# Patient Record
Sex: MALE | Race: WHITE | NOT HISPANIC OR LATINO
[De-identification: names, ages, dates, MRNs, and addresses within clinical notes are randomized per-mention and may not be internally consistent; named-entity substitution may affect disease eponyms.]

---

## 2020-12-17 ENCOUNTER — APPOINTMENT (OUTPATIENT)
Dept: NEPHROLOGY | Facility: CLINIC | Age: 65
End: 2020-12-17
Payer: COMMERCIAL

## 2020-12-17 DIAGNOSIS — Z78.9 OTHER SPECIFIED HEALTH STATUS: ICD-10-CM

## 2020-12-17 DIAGNOSIS — N40.0 BENIGN PROSTATIC HYPERPLASIA WITHOUT LOWER URINARY TRACT SYMPMS: ICD-10-CM

## 2020-12-17 DIAGNOSIS — Z86.59 PERSONAL HISTORY OF OTHER MENTAL AND BEHAVIORAL DISORDERS: ICD-10-CM

## 2020-12-17 DIAGNOSIS — Z82.49 FAMILY HISTORY OF ISCHEMIC HEART DISEASE AND OTHER DISEASES OF THE CIRCULATORY SYSTEM: ICD-10-CM

## 2020-12-17 DIAGNOSIS — R73.03 PREDIABETES.: ICD-10-CM

## 2020-12-17 DIAGNOSIS — Z83.0 FAMILY HISTORY OF HUMAN IMMUNODEFICIENCY VIRUS [HIV] DISEASE: ICD-10-CM

## 2020-12-17 DIAGNOSIS — Z87.891 PERSONAL HISTORY OF NICOTINE DEPENDENCE: ICD-10-CM

## 2020-12-17 DIAGNOSIS — Z80.0 FAMILY HISTORY OF MALIGNANT NEOPLASM OF DIGESTIVE ORGANS: ICD-10-CM

## 2020-12-17 PROCEDURE — 99204 OFFICE O/P NEW MOD 45 MIN: CPT | Mod: 95

## 2020-12-17 RX ORDER — ASPIRIN 81 MG/1
81 TABLET, COATED ORAL DAILY
Refills: 0 | Status: ACTIVE | COMMUNITY

## 2020-12-17 RX ORDER — LAMOTRIGINE 300 MG/1
300 TABLET, FILM COATED, EXTENDED RELEASE ORAL DAILY
Refills: 0 | Status: ACTIVE | COMMUNITY

## 2020-12-17 RX ORDER — TAMSULOSIN HYDROCHLORIDE 0.4 MG/1
0.4 CAPSULE ORAL
Refills: 0 | Status: ACTIVE | COMMUNITY

## 2020-12-17 RX ORDER — OLANZAPINE 5 MG/1
5 TABLET ORAL DAILY
Refills: 0 | Status: ACTIVE | COMMUNITY

## 2020-12-17 RX ORDER — MELATONIN 3 MG
25 MCG TABLET ORAL DAILY
Refills: 0 | Status: ACTIVE | COMMUNITY

## 2020-12-17 RX ORDER — ESZOPICLONE 3 MG/1
3 TABLET, COATED ORAL
Refills: 0 | Status: ACTIVE | COMMUNITY

## 2020-12-17 RX ORDER — ATORVASTATIN CALCIUM 20 MG/1
20 TABLET, FILM COATED ORAL DAILY
Refills: 0 | Status: ACTIVE | COMMUNITY

## 2020-12-17 NOTE — HISTORY OF PRESENT ILLNESS
[Home] : at home, [unfilled] , at the time of the visit. [Medical Office: ([facility])___] : at the medical office located in  [Verbal consent obtained from patient] : the patient, [unfilled] [FreeTextEntry4] : Rupal Scott [FreeTextEntry1] : \par Joao Nieto is a 65-year old gentleman who is currently referred for renal cysts.  His past medical history is significant for dyslipidemia, BPH, a recent diagnosis of  prediabetes mellitus, bipolar disorder with no history of lithium use, and renal cysts.  The renal cysts were first noted around 6-7 years ago during workup for back pain.  A renal cyst was drained in the past due to pain.  His mother does not have renal cysts.  His father  at age 76 with colon cancer.  He has a brother who he does not know much about and a sister who  of AIDS at age 35.  \par \par Mr. Nieto gets back pain when he is at work.  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  He denies having visible hematuria.

## 2020-12-17 NOTE — REVIEW OF SYSTEMS
[Easy Bruising] : a tendency for easy bruising [Negative] : Endocrine [FreeTextEntry8] : nocturia x 0-1

## 2020-12-17 NOTE — ASSESSMENT
[FreeTextEntry1] : Joao Nieto is a 65-year old gentleman with dyslipidemia, BPH, a recent diagnosis of prediabetes mellitus, bipolar disorder with no history of lithium use, and renal cysts.  The renal cysts were first noted around 6-7 years ago during workup for back pain.  Mr. Nieto has no visible hematuria.  There is no family history of renal cystic disease. \par \par The renal ultrasound of 07/01/2020 demonstrates a 13.6 cm right kidney and 12.6 cm left kidney with diffuse bilateral renal cortical parenchyma thinning and increased cortical parenchymal echogenicity.\par \par There are numerous bilateral renal cysts including large right renal cysts measuring  5.5 x 4.8 x 5.4 cm , 6.3 x 5.6 x 5.7 cm, and 4.7 x 3.9 x 4.7 cm and large left renal cysts measuring 7.7 x 6.5 x 7 cm, 7.7 x 5.9 x 6.8 cm, and 4.6 x 3.7 x 3.8 cm.  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  I am currently unclear as to whether this represents autosomal dominant polycystic kidney disease or acquired renal cystic disease.  The renal function is normal.  \par \par RECOMMENDATIONS:\par \par Mr. Nieto will ask his mother whether she ever had any renal imaging.  \par \par I will speak with the radiologist who read the ultrasound (Dr. Sacha Reyes)\par \par We spoke about how to protect the kidneys:\par -Good blood pressure control\par -Avoid the use of NSAIDS (ibuprofen, Motrin, Aleve,Celebrex, etc.).  Okay to use Tylenol.\par -Avoid salt 'like the plague'.  Limit sodium intake.  Do not add salt to your food.\par -Limit the intake of animal products.  Avoid high animal protein diets.\par -Stay well hydrated.\par -Good cholesterol control.\par -Good diabetic control\par \par Mr. Nieto will return to me in 6 months with a renal ultrasound, CMP, CBC, phosphorous, urinalysis, random urine for microalbumin and creatinine. \par

## 2020-12-17 NOTE — CONSULT LETTER
[Dear  ___] : Dear  [unfilled], [Consult Letter:] : I had the pleasure of evaluating your patient, [unfilled]. [Please see my note below.] : Please see my note below. [Consult Closing:] : Thank you very much for allowing me to participate in the care of this patient.  If you have any questions, please do not hesitate to contact me. [Sincerely,] : Sincerely, [FreeTextEntry3] : Grey

## 2021-05-20 ENCOUNTER — NON-APPOINTMENT (OUTPATIENT)
Age: 66
End: 2021-05-20

## 2021-05-28 DIAGNOSIS — Z00.00 ENCOUNTER FOR GENERAL ADULT MEDICAL EXAMINATION W/OUT ABNORMAL FINDINGS: ICD-10-CM

## 2021-06-03 ENCOUNTER — APPOINTMENT (OUTPATIENT)
Dept: NEPHROLOGY | Facility: CLINIC | Age: 66
End: 2021-06-03
Payer: COMMERCIAL

## 2021-06-03 PROCEDURE — 99213 OFFICE O/P EST LOW 20 MIN: CPT | Mod: 95

## 2021-06-03 NOTE — ASSESSMENT
[FreeTextEntry1] : Joao Nieto is a 66-year old gentleman with dyslipidemia, BPH, a recent diagnosis of prediabetes mellitus, bipolar disorder with no history of lithium use, and renal cysts.  The renal cysts were first noted around 6-7 years ago during workup for back pain.  Mr. Nieto has no visible hematuria.  There is no family history of renal cystic disease. \par \par The renal ultrasound of 07/01/2020 demonstrates a 13.6 cm right kidney and 12.6 cm left kidney with diffuse bilateral renal cortical parenchyma thinning and increased cortical parenchymal echogenicity. There are numerous bilateral renal cysts including large right renal cysts measuring  5.5 x 4.8 x 5.4 cm , 6.3 x 5.6 x 5.7 cm, and 4.7 x 3.9 x 4.7 cm and large left renal cysts measuring 7.7 x 6.5 x 7 cm, 7.7 x 5.9 x 6.8 cm, and 4.6 x 3.7 x 3.8 cm.  \par \par RENAL ULTRASOUND (05/29/2021)  R. 16.7 cm, L. 13.4 cm, bilateral parenchymal thinning and scattered cortical cysts measuring up to 8 cm in the left interpolar retion and 6 cm in the right renal upper pole. There are no suspicious solid masses.  \par \par The BUN/creatinine levels have trended as follow:  23/1.2 (06/29/2020), 19/1.24 (06/01/2021).  The urine albumin to creatinine ratio is 35 mcg per mg (normal is < 30).  The urinalysis is normal. 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  I am currently unclear as to whether this represents autosomal dominant polycystic kidney disease or acquired renal cystic disease.  The renal function remains normal.  He asked his mother about her medical history.  She told him she has cysts on her kidneys. \par \par RECOMMENDATIONS:\par \par I think that Mr. Nieto would benefit by taking a low dose angiotensin receptor blocker (losartan 25 mg once daily).  Unfortunately I do not have any blood pressures on him. \par \par Alexander will check his blood pressure twice daily for the next 3-5 days.  He will record them and call me next week.  I will prescribe losartan 25 mg daily if his blood pressures tolerate it. \par \par He will return to me in 4 months with a BMP, urinalysis, random urine for microalbumin and creatinine.\par

## 2021-06-03 NOTE — HISTORY OF PRESENT ILLNESS
[Home] : at home, [unfilled] , at the time of the visit. [Medical Office: ([facility])___] : at the medical office located in  [Verbal consent obtained from patient] : the patient, [unfilled] [FreeTextEntry4] : Rosy Rosario [FreeTextEntry1] : \par Joao Nieto is a 66-year old gentleman who I saw in consult on 2020  for renal cysts.  His past medical history is significant for dyslipidemia, BPH, a recent diagnosis of  prediabetes mellitus, bipolar disorder with no history of lithium use, and renal cysts.  The renal cysts were first noted around 6-7 years ago during workup for back pain.  A renal cyst was drained in the past due to pain.  His mother does not have renal cysts.  His father  at age 76 with colon cancer.  He has a brother who he does not know much about and a sister who  of AIDS at age 35.  \par \par I am seeing Mr. Nieto today via teleheatlth video conferencing.  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  He has not had any recent back pain. He denies having visible hematuria.

## 2021-06-03 NOTE — REVIEW OF SYSTEMS
[Negative] : Heme/Lymph [FreeTextEntry7] : hemorrhoidal bleeding at times [FreeTextEntry8] : nocturia x 0-1

## 2021-10-14 ENCOUNTER — APPOINTMENT (OUTPATIENT)
Dept: NEPHROLOGY | Facility: CLINIC | Age: 66
End: 2021-10-14
Payer: COMMERCIAL

## 2021-10-14 PROCEDURE — 99214 OFFICE O/P EST MOD 30 MIN: CPT | Mod: 95

## 2021-10-14 NOTE — ASSESSMENT
[FreeTextEntry1] : Joao Nieto is a 66-year old gentleman with dyslipidemia, BPH, a recent diagnosis of prediabetes mellitus, bipolar disorder with no history of lithium use, and renal cysts.  The renal cysts were first noted around 6-7 years ago during workup for back pain.  Mr. Nieto has no visible hematuria.  There is no family history of renal cystic disease. \par \par The renal ultrasound of 07/01/2020 demonstrates a 13.6 cm right kidney and 12.6 cm left kidney with diffuse bilateral renal cortical parenchyma thinning and increased cortical parenchymal echogenicity. There are numerous bilateral renal cysts including large right renal cysts measuring  5.5 x 4.8 x 5.4 cm , 6.3 x 5.6 x 5.7 cm, and 4.7 x 3.9 x 4.7 cm and large left renal cysts measuring 7.7 x 6.5 x 7 cm, 7.7 x 5.9 x 6.8 cm, and 4.6 x 3.7 x 3.8 cm.  \par \par RENAL ULTRASOUND (05/29/2021)  R. 16.7 cm, L. 13.4 cm, bilateral parenchymal thinning and scattered cortical cysts measuring up to 8 cm in the left interpolar region and 6 cm in the right renal upper pole. There are no suspicious solid masses.  \par \par The BUN/creatinine levels have trended as follow:  23/1.2 (06/29/2020), 19/1.24 (06/01/2021), 18/1.28 (05/05/2021).  The urine albumin to creatinine ratio is 31 mcg per mg (normal is < 30).  The urinalysis is normal. 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  I am currently unclear as to whether this represents autosomal dominant polycystic kidney disease or acquired renal cystic disease.  The renal function remains normal.  He asked his mother about her medical history.  She told him she has cysts on her kidneys. \par \par RECOMMENDATIONS:\par \par I think that Mr. Nieto would benefit by taking a low dose angiotensin receptor blocker (losartan 25 mg once daily).  Unfortunately I do not have any blood pressures on him.   He measured a blood pressure today at home on his automatic machine.  It was 149/94, 136/92. .    \par \par (1) Start losartan 25 mg once daily.\par (2) Goal systolic pressure is < 130 mm Hg, ideally closer to 120 mm Hg. \par (3) I ordered a BMP to be checked in around 1-2 months.\par (3) If all is stable I will see Mr. Nieto back in 1 year with a CMP, urinalysis, random urine for microalbumin and creatinine, and a repeat renal ultrasound in order to follow the renal cyst progression. \par \par

## 2021-10-14 NOTE — REVIEW OF SYSTEMS
[Negative] : Heme/Lymph [FreeTextEntry7] : hemorrhoidal bleeding has not occurred since increasing vegetables in the diet

## 2021-10-14 NOTE — HISTORY OF PRESENT ILLNESS
[Home] : at home, [unfilled] , at the time of the visit. [Medical Office: ([facility])___] : at the medical office located in  [Verbal consent obtained from patient] : the patient, [unfilled] [FreeTextEntry4] : Rosy Rosario [FreeTextEntry1] : \par Joao Nieto is a 66-year old gentleman who I saw in consult on 2020  for renal cysts.  His past medical history is significant for dyslipidemia, BPH, prediabetes mellitus, bipolar disorder with no history of lithium use, and renal cysts.  The renal cysts were first noted around 7 years ago during workup for back pain.  A renal cyst was drained in the past due to pain.  His mother does not have renal cysts.  His father  at age 76 with colon cancer.  He has a brother who he does not know much about and a sister who  of AIDS at age 35.  \par \par I am seeing Mr. Nieto today via teleheatlth video conferencing.  He has remained healthy.  He received Pfizer vaccines and a booster in 2021.  He was having back pain and changed his job to "[employer]".  His back is "not too bad".  Every few days he sometimes gets pain on his right side.  He denies having visible hematuria.

## 2021-10-14 NOTE — CONSULT LETTER
[Dear  ___] : Dear  [unfilled], [Consult Letter:] : I had the pleasure of evaluating your patient, [unfilled]. [Please see my note below.] : Please see my note below. [Consult Closing:] : Thank you very much for allowing me to participate in the care of this patient.  If you have any questions, please do not hesitate to contact me. [Sincerely,] : Sincerely, [FreeTextEntry3] : Grey [DrRalph  ___] : Dr. GARCIA

## 2022-04-07 ENCOUNTER — APPOINTMENT (OUTPATIENT)
Dept: NEPHROLOGY | Facility: CLINIC | Age: 67
End: 2022-04-07
Payer: COMMERCIAL

## 2022-04-07 VITALS
DIASTOLIC BLOOD PRESSURE: 72 MMHG | TEMPERATURE: 98 F | SYSTOLIC BLOOD PRESSURE: 120 MMHG | RESPIRATION RATE: 16 BRPM | HEART RATE: 70 BPM | OXYGEN SATURATION: 96 % | HEIGHT: 71 IN

## 2022-04-07 PROCEDURE — 99214 OFFICE O/P EST MOD 30 MIN: CPT

## 2022-04-07 NOTE — ASSESSMENT
[FreeTextEntry1] : Joao Nieto is a 66-year old gentleman with dyslipidemia, BPH, a recent diagnosis of prediabetes mellitus, bipolar disorder with no history of lithium use, and renal cysts.  The renal cysts were first noted around 6-7 years ago during workup for back pain.  Mr. Nieto has no visible hematuria.  There is no family history of renal cystic disease. \par \par RENAL ULTRASOUND (07/01/2020) 13.6 cm right kidney and 12.6 cm left kidney with diffuse bilateral renal cortical parenchyma thinning and increased cortical parenchymal echogenicity. There are numerous bilateral renal cysts including large right renal cysts measuring  5.5 x 4.8 x 5.4 cm , 6.3 x 5.6 x 5.7 cm, and 4.7 x 3.9 x 4.7 cm and large left renal cysts measuring 7.7 x 6.5 x 7 cm, 7.7 x 5.9 x 6.8 cm, and 4.6 x 3.7 x 3.8 cm.  \par \par RENAL ULTRASOUND (05/29/2021)  R. 16.7 cm, L. 13.4 cm, bilateral parenchymal thinning and scattered cortical cysts measuring up to 8 cm in the left interpolar region and 6 cm in the right renal upper pole. There are no suspicious solid masses.  \par \par RENAL ULTRASOUND (03/14/2022) R. 17 cm with multiple renal cysts measuring up to 6.1 cm with increased renal echogenicity.  L. 13.4 cm with multiple renal cysts measuring up to 8.9 cm, exophytic cyst off lower pose, increased renal echogenicity. \par \par The BUN/creatinine levels have trended as follow:  23/1.2 (06/29/2020), 19/1.24 (06/01/2021), 18/1.28 (05/05/2021), 23/1.27 (03/04/2022)  The last two urine albumin to creatinine ratios were 31 mcg/mg and 37 mcg/mg (normal is < 30).  The urinalyses were both  normal. 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  I am currently unclear as to whether this represents autosomal dominant polycystic kidney disease or acquired renal cystic disease.  I am suspicious for ADPKD. The renal function remains normal.  He asked his mother about her medical history.  She told him she has cysts on her kidneys. \par \par Blood pressures are well controlled.  Mr. Nieto did not tolerate losartan 25 mg once daily. \par \par RECOMMENDATIONS:\par \par I think that Mr. Nieto would benefit by taking a low dose angiotensin receptor blocker though he did not tolerate it. \par \par (1) We talked about ways to protect the kidneys:\par  -Good blood pressure control\par -Avoid the use of NSAIDS (ibuprofen, Motrin, Aleve,Celebrex, etc.).  Okay to use Tylenol.\par -Avoid salt 'like the plague'.  Limit sodium intake.  Do not add salt to your food.\par -Limit the intake of animal products.  Avoid high animal protein diets.\par -Stay well hydrated.\par -Good cholesterol control.\par  \par (2) Repeat CMP, urinalysis, random urine for microalbumin and creatinine prior to follow up in 6 months.\par \par (3) Repeat renal ultrasound yearly. \par \par

## 2022-04-07 NOTE — PHYSICAL EXAM
[General Appearance - Alert] : alert [General Appearance - In No Acute Distress] : in no acute distress [Sclera] : the sclera and conjunctiva were normal [Extraocular Movements] : extraocular movements were intact [Neck Appearance] : the appearance of the neck was normal [] : no respiratory distress [Respiration, Rhythm And Depth] : normal respiratory rhythm and effort [Exaggerated Use Of Accessory Muscles For Inspiration] : no accessory muscle use [Heart Rate And Rhythm] : heart rate was normal and rhythm regular [Heart Sounds] : normal S1 and S2 [Heart Sounds Gallop] : no gallops [Murmurs] : no murmurs [Heart Sounds Pericardial Friction Rub] : no pericardial rub [Edema] : there was no peripheral edema [Bowel Sounds] : normal bowel sounds [Abdomen Soft] : soft [No CVA Tenderness] : no ~M costovertebral angle tenderness [No Spinal Tenderness] : no spinal tenderness [Abnormal Walk] : normal gait [Involuntary Movements] : no involuntary movements were seen [Skin Color & Pigmentation] : normal skin color and pigmentation [Skin Turgor] : normal skin turgor [No Focal Deficits] : no focal deficits [Oriented To Time, Place, And Person] : oriented to person, place, and time [Impaired Insight] : insight and judgment were intact [Affect] : the affect was normal [Mood] : the mood was normal

## 2022-04-07 NOTE — HISTORY OF PRESENT ILLNESS
[FreeTextEntry1] : Joao Nieto is a 66-year old gentleman who I saw in consult on 2020  for renal cysts.  His past medical history is significant for dyslipidemia, BPH, prediabetes mellitus, bipolar disorder with no history of lithium use, and renal cysts.  The renal cysts were first noted around 7 years prior during workup for back pain.  A renal cyst was drained in the past due to pain.  His mother does not have renal cysts.  His father  at age 76 with colon cancer.  He has a brother who he does not know much about and a sister who  of AIDS at age 35.  \par \par I am seeing Mr. Nieto today in person today.  He has remained healthy.  He received Pfizer vaccines and a booster in 2021.  He was having back pain and changed his job to "[employer] to  [employer]".  He continues to work there.  He says he his back aches have improved.    Every few days he used to get on his right side.  He denies having visible hematuria. This pain improved after changing his job. \par \par Mr. Nieto was prescribed losartan during his last visit with me.  He stopped taking it "because it was driving my blood pressure down to the 80s".  He was feeling sluggish at the time.  This has resolved.

## 2022-04-08 ENCOUNTER — RX RENEWAL (OUTPATIENT)
Age: 67
End: 2022-04-08

## 2022-10-06 ENCOUNTER — APPOINTMENT (OUTPATIENT)
Dept: NEPHROLOGY | Facility: CLINIC | Age: 67
End: 2022-10-06

## 2022-10-06 VITALS
OXYGEN SATURATION: 96 % | SYSTOLIC BLOOD PRESSURE: 122 MMHG | HEIGHT: 71 IN | DIASTOLIC BLOOD PRESSURE: 76 MMHG | BODY MASS INDEX: 26.46 KG/M2 | HEART RATE: 72 BPM | WEIGHT: 189 LBS | TEMPERATURE: 98.2 F | RESPIRATION RATE: 18 BRPM

## 2022-10-06 DIAGNOSIS — D72.829 ELEVATED WHITE BLOOD CELL COUNT, UNSPECIFIED: ICD-10-CM

## 2022-10-06 PROCEDURE — 99214 OFFICE O/P EST MOD 30 MIN: CPT

## 2022-10-06 RX ORDER — LOSARTAN POTASSIUM 25 MG/1
25 TABLET, FILM COATED ORAL
Qty: 90 | Refills: 0 | Status: DISCONTINUED | COMMUNITY
Start: 2021-10-14 | End: 2022-10-06

## 2022-10-06 NOTE — ASSESSMENT
[FreeTextEntry1] : Joao Nieto is a 67-year old gentleman with dyslipidemia, BPH, a prediabetes mellitus, bipolar disorder with no history of lithium use, and renal cysts.  The renal cysts were first noted around 6-7 years ago during workup for back pain.  Mr. Nieto has no visible hematuria.  There is no family history of renal cystic disease. \par \par RENAL ULTRASOUND (07/01/2020) 13.6 cm right kidney and 12.6 cm left kidney with diffuse bilateral renal cortical parenchyma thinning and increased cortical parenchymal echogenicity. There are numerous bilateral renal cysts including large right renal cysts measuring  5.5 x 4.8 x 5.4 cm , 6.3 x 5.6 x 5.7 cm, and 4.7 x 3.9 x 4.7 cm and large left renal cysts measuring 7.7 x 6.5 x 7 cm, 7.7 x 5.9 x 6.8 cm, and 4.6 x 3.7 x 3.8 cm.  \par \par RENAL ULTRASOUND (05/29/2021)  R. 16.7 cm, L. 13.4 cm, bilateral parenchymal thinning and scattered cortical cysts measuring up to 8 cm in the left interpolar region and 6 cm in the right renal upper pole. There are no suspicious solid masses.  \par \par RENAL ULTRASOUND (03/14/2022) R. 17 cm with multiple renal cysts measuring up to 6.1 cm with increased renal echogenicity.  L. 13.4 cm with multiple renal cysts measuring up to 8.9 cm, exophytic cyst off lower pose, increased renal echogenicity. \par \par The BUN/creatinine levels have trended as follow:  23/1.2 (06/29/2020), 19/1.24 (06/01/2021), 18/1.28 (05/05/2021), 23/1.27 (03/04/2022), 23/1.33 (09/08/2022). The prior two urine albumin to creatinine ratios were 31 mcg/mg and 37 mcg/mg (normal is < 30).  The recent one was 58 mcg per mg.  The urinalysis has some LE though is without protein, blood, white or red cells.   \par \par IMPRESSION:\par \par (1) CKD. There is clearly significant bilateral renal cystic disease.  I am currently unclear as to whether this represents autosomal dominant polycystic kidney disease or acquired renal cystic disease.  I am suspicious for ADPKD. The renal function remains fairly close to normal.  He asked his mother about her medical history.  She told him she has cysts on her kidneys. \par \par (2) Blood pressures are well controlled.  Mr. Nieto did not tolerate losartan 25 mg once daily. \par \par (3) His WBC was elevated last month (12,400/mcL)..  He was not ill.  He denies having a cough, fever, or dysuria. \par \par ( 4) Lipid panel.  Total cholesterol 171 mg/dL, HDL 49 mg/dL, TG 81 mg/dL,  mg/dL.  Ideally, would like the LDL < 100 mg/dL. \par \par RECOMMENDATIONS:\par \par I think that Mr. Nieto would benefit by taking a low dose angiotensin receptor blocker though he did not tolerate it. \par \par (1) We talked about ways to protect the kidneys:\par  -Good blood pressure control\par -Avoid the use of NSAIDS (ibuprofen, Motrin, Aleve,Celebrex, etc.).  Okay to use Tylenol.\par -Avoid salt 'like the plague'.  Limit sodium intake.  Do not add salt to your food.\par -Limit the intake of animal products.  Avoid high animal protein diets.\par -Stay well hydrated.\par -Good cholesterol control.\par  \par (2) Repeat CMP, urinalysis, random urine for microalbumin and creatinine prior to follow up in 6 months.\par \par (3) Repeat renal ultrasound yearly prior to follow up in 6 months.\par \par (4) Limit animal produces and add exercise to lower cholesterol. \par \par (4) Dr. Merida.  I ordered a repeat CBC. I will forward it to you.\par \par Return in 6 months with the tests below: \par \par

## 2022-10-06 NOTE — HISTORY OF PRESENT ILLNESS
[FreeTextEntry1] : Joao Nieto is a 67-year old gentleman who I saw in consult on 2020  for renal cysts.  His past medical history is significant for dyslipidemia, BPH, prediabetes mellitus, bipolar disorder with no history of lithium use, and renal cysts.  The renal cysts were first noted around  prior during workup for back pain.  A renal cyst was drained in the past due to pain.  His mother does not have renal cysts.  His father  at age 76 with colon cancer.  He has a brother who he does not know much about and a sister who  of AIDS at age 35.  \par \par I am seeing Mr. Nieto today in person today.  He has remained healthy.  He is up to date with his vaccines including the new bivalent Omicron COVID vaccine. He continues to work at '[employer]  He ha not had any significant back problems.\par \par Mr. Nieto was prescribed losartan during a prior visit.  He stopped taking it "because it was driving my blood pressure down to the 80s".  He was feeling sluggish at the time.  This  resolved.

## 2023-01-22 ENCOUNTER — NON-APPOINTMENT (OUTPATIENT)
Age: 68
End: 2023-01-22

## 2023-03-08 ENCOUNTER — RESULT REVIEW (OUTPATIENT)
Age: 68
End: 2023-03-08

## 2023-04-20 ENCOUNTER — APPOINTMENT (OUTPATIENT)
Dept: NEPHROLOGY | Facility: CLINIC | Age: 68
End: 2023-04-20
Payer: MEDICARE

## 2023-04-20 VITALS
SYSTOLIC BLOOD PRESSURE: 126 MMHG | DIASTOLIC BLOOD PRESSURE: 80 MMHG | OXYGEN SATURATION: 96 % | RESPIRATION RATE: 18 BRPM | BODY MASS INDEX: 26.32 KG/M2 | HEART RATE: 62 BPM | HEIGHT: 71 IN | WEIGHT: 188 LBS | TEMPERATURE: 98 F

## 2023-04-20 PROCEDURE — 99214 OFFICE O/P EST MOD 30 MIN: CPT

## 2023-04-20 NOTE — HISTORY OF PRESENT ILLNESS
[FreeTextEntry1] : Joao Nieto is a 67-year old gentleman who I saw in consult on 2020  for renal cysts.  His past medical history is significant for dyslipidemia, BPH, prediabetes mellitus, bipolar disorder with no history of lithium use, and renal cysts.  The renal cysts were first noted around  prior during workup for back pain.  A renal cyst was drained in the past due to pain.  His mother does not have renal cysts.  His father  at age 76 with colon cancer.  He has a brother who he does not know much about and a sister who  of AIDS at age 35.  \par \par I am seeing Mr. Nieto today in person today.  He continues to work at '[employer]  He ha not had any significant back problems.Lashawn Nieto was prescribed losartan during a prior visit.  He stopped taking it "because it was driving my blood pressure down to the 80s".  He was feeling sluggish at the time.  This resolved.

## 2023-04-20 NOTE — CONSULT LETTER
[Dear  ___] : Dear  [unfilled], [Consult Letter:] : I had the pleasure of evaluating your patient, [unfilled]. [Please see my note below.] : Please see my note below. [Consult Closing:] : Thank you very much for allowing me to participate in the care of this patient.  If you have any questions, please do not hesitate to contact me. [Sincerely,] : Sincerely, [FreeTextEntry3] : rGey

## 2023-04-20 NOTE — ASSESSMENT
[FreeTextEntry1] : Joao Nieto is a 67-year old gentleman with dyslipidemia, BPH, a prediabetes mellitus, bipolar disorder with no history of lithium use, and renal cysts.  The renal cysts were first noted around 6-7 years ago during workup for back pain.  Mr. Nieto has no visible hematuria.  There is no family history of renal cystic disease. \par \par RENAL ULTRASOUND (07/01/2020) 13.6 cm right kidney and 12.6 cm left kidney with diffuse bilateral renal cortical parenchyma thinning and increased cortical parenchymal echogenicity. There are numerous bilateral renal cysts including large right renal cysts measuring  5.5 x 4.8 x 5.4 cm , 6.3 x 5.6 x 5.7 cm, and 4.7 x 3.9 x 4.7 cm and large left renal cysts measuring 7.7 x 6.5 x 7 cm, 7.7 x 5.9 x 6.8 cm, and 4.6 x 3.7 x 3.8 cm.  \par \par RENAL ULTRASOUND (05/29/2021)  R. 16.7 cm, L. 13.4 cm, bilateral parenchymal thinning and scattered cortical cysts measuring up to 8 cm in the left interpolar region and 6 cm in the right renal upper pole. There are no suspicious solid masses.  \par \par RENAL ULTRASOUND (03/14/2022) R. 17 cm with multiple renal cysts measuring up to 6.1 cm with increased renal echogenicity.  L. 13.4 cm with multiple renal cysts measuring up to 8.9 cm, exophytic cyst off lower pose, increased renal echogenicity. \par \par RENAL ULTRASOUND (03/09/2023) R. 18.1 cm.  Multiple anechoic structures consistent with cysts.  L. 18.9 cm.  Multiple anechoic structures consistent with cysts. Unchanged mildly increased renal cortical echogenicity.  Kidneys are not significantly changed in size when compared to the prior study. \par \par The BUN/creatinine levels have trended as follow:  23/1.2 (06/29/2020), 19/1.24 (06/01/2021), 18/1.28 (05/05/2021), 23/1.27 (03/04/2022), 23/1.33 (09/08/2022), 21/1.29 (03/02/2023). The urine albumin to creatinine ratio has trended as follows:  31 mcg/mg,  37 mcg/mg,  58 mcg/mg, 41 mcg/mg.  The urinalysis has some LE and 1+ occult blood with no WBCs or RBCs.\par \par IMPRESSION:\par \par (1) Renal cystic disease. There is clearly significant bilateral renal cystic disease.  I am currently unclear as to whether this represents autosomal dominant polycystic kidney disease or acquired renal cystic disease.  I am suspicious for ADPKD. The renal function remains fairly close to normal.  He asked his mother about her medical history.  She told him she has cysts on her kidneys. \par \par (2) Blood pressures are well controlled.  Mr. Nieto did not tolerate losartan 25 mg once daily.  He became quite hypotensive.  \par \par (3) Normal WBC count.  6,400 on labs of 03/02/2023.\par \par RECOMMENDATIONS:\par \par I think that Mr. Nieto would benefit by taking a low dose angiotensin receptor blocker though he did not tolerate it. \par \par (1) We talked about ways to protect the kidneys:\par  -Good blood pressure control\par -Avoid the use of NSAIDS (ibuprofen, Motrin, Aleve,Celebrex, etc.).  Okay to use Tylenol.\par -Avoid salt 'like the plague'.  Limit sodium intake.  Do not add salt to your food.\par -Limit the intake of animal products.  Avoid high animal protein diets.\par -Stay well hydrated.\par -Good cholesterol control.\par  \par (2) Repeat CMP, urinalysis, random urine for microalbumin and creatinine prior to follow up in 6 months.\par \par (3) Repeat renal ultrasound in March 2023. \par \par (4) Limit animal produces and add exercise to lower cholesterol. \par \par (4) Dr. Merida.  I ordered a repeat CBC. I will forward it to you.\par \par Return in 6 months with the tests below: \par \par

## 2023-09-19 LAB
ALBUMIN SERPL ELPH-MCNC: 4.8 G/DL
ALP BLD-CCNC: 88 U/L
ALT SERPL-CCNC: 38 U/L
ANION GAP SERPL CALC-SCNC: 13 MMOL/L
APPEARANCE: CLEAR
AST SERPL-CCNC: 32 U/L
BACTERIA: NEGATIVE /HPF
BILIRUB SERPL-MCNC: 0.5 MG/DL
BILIRUBIN URINE: NEGATIVE
BLOOD URINE: NEGATIVE
BUN SERPL-MCNC: 20 MG/DL
CALCIUM SERPL-MCNC: 10.1 MG/DL
CHLORIDE SERPL-SCNC: 104 MMOL/L
CHOLEST SERPL-MCNC: 178 MG/DL
CO2 SERPL-SCNC: 25 MMOL/L
COLOR: YELLOW
CREAT SERPL-MCNC: 1.31 MG/DL
CREAT SPEC-SCNC: 75 MG/DL
EGFR: 60 ML/MIN/1.73M2
GLUCOSE QUALITATIVE U: NEGATIVE MG/DL
GLUCOSE SERPL-MCNC: 73 MG/DL
HDLC SERPL-MCNC: 45 MG/DL
KETONES URINE: NEGATIVE MG/DL
LDLC SERPL CALC-MCNC: 104 MG/DL
LEUKOCYTE ESTERASE URINE: NEGATIVE
MICROALBUMIN 24H UR DL<=1MG/L-MCNC: 5.7 MG/DL
MICROALBUMIN/CREAT 24H UR-RTO: 77 MG/G
MICROSCOPIC-UA: NORMAL
NITRITE URINE: NEGATIVE
NONHDLC SERPL-MCNC: 132 MG/DL
PH URINE: 6
POTASSIUM SERPL-SCNC: 4.6 MMOL/L
PROT SERPL-MCNC: 6.8 G/DL
PROTEIN URINE: NEGATIVE MG/DL
RED BLOOD CELLS URINE: 1 /HPF
SODIUM SERPL-SCNC: 142 MMOL/L
SPECIFIC GRAVITY URINE: 1.01
TRIGL SERPL-MCNC: 160 MG/DL
UROBILINOGEN URINE: 0.2 MG/DL
WHITE BLOOD CELLS URINE: 2 /HPF

## 2023-10-23 ENCOUNTER — APPOINTMENT (OUTPATIENT)
Dept: NEPHROLOGY | Facility: CLINIC | Age: 68
End: 2023-10-23
Payer: MEDICARE

## 2023-10-23 VITALS
SYSTOLIC BLOOD PRESSURE: 132 MMHG | WEIGHT: 190 LBS | DIASTOLIC BLOOD PRESSURE: 78 MMHG | HEIGHT: 71 IN | TEMPERATURE: 98.1 F | BODY MASS INDEX: 26.6 KG/M2 | OXYGEN SATURATION: 97 % | HEART RATE: 62 BPM | RESPIRATION RATE: 16 BRPM

## 2023-10-23 DIAGNOSIS — E78.5 HYPERLIPIDEMIA, UNSPECIFIED: ICD-10-CM

## 2023-10-23 PROCEDURE — 99214 OFFICE O/P EST MOD 30 MIN: CPT

## 2024-02-29 ENCOUNTER — RESULT REVIEW (OUTPATIENT)
Age: 69
End: 2024-02-29

## 2024-03-08 LAB
ALBUMIN SERPL ELPH-MCNC: 4.8 G/DL
ALP BLD-CCNC: 77 U/L
ALT SERPL-CCNC: 27 U/L
ANION GAP SERPL CALC-SCNC: 13 MMOL/L
APPEARANCE: CLEAR
AST SERPL-CCNC: 26 U/L
BACTERIA: NEGATIVE /HPF
BILIRUB SERPL-MCNC: 0.4 MG/DL
BILIRUBIN URINE: NEGATIVE
BLOOD URINE: NEGATIVE
BUN SERPL-MCNC: 18 MG/DL
CALCIUM SERPL-MCNC: 9.7 MG/DL
CAST: 0 /LPF
CHLORIDE SERPL-SCNC: 105 MMOL/L
CO2 SERPL-SCNC: 22 MMOL/L
COLOR: YELLOW
CREAT SERPL-MCNC: 1.24 MG/DL
CREAT SPEC-SCNC: 82 MG/DL
EGFR: 64 ML/MIN/1.73M2
EPITHELIAL CELLS: 0 /HPF
GLUCOSE QUALITATIVE U: NEGATIVE MG/DL
GLUCOSE SERPL-MCNC: 120 MG/DL
KETONES URINE: NEGATIVE MG/DL
LEUKOCYTE ESTERASE URINE: NEGATIVE
MICROALBUMIN 24H UR DL<=1MG/L-MCNC: 10.7 MG/DL
MICROALBUMIN/CREAT 24H UR-RTO: 130 MG/G
MICROSCOPIC-UA: NORMAL
NITRITE URINE: NEGATIVE
PH URINE: 6
PHOSPHATE SERPL-MCNC: 2.8 MG/DL
POTASSIUM SERPL-SCNC: 4.4 MMOL/L
PROT SERPL-MCNC: 7.4 G/DL
PROTEIN URINE: NORMAL MG/DL
RED BLOOD CELLS URINE: 2 /HPF
SODIUM SERPL-SCNC: 140 MMOL/L
SPECIFIC GRAVITY URINE: 1.01
UROBILINOGEN URINE: 0.2 MG/DL
WHITE BLOOD CELLS URINE: 1 /HPF

## 2024-04-23 ENCOUNTER — APPOINTMENT (OUTPATIENT)
Dept: NEPHROLOGY | Facility: CLINIC | Age: 69
End: 2024-04-23
Payer: MEDICARE

## 2024-04-23 VITALS
SYSTOLIC BLOOD PRESSURE: 118 MMHG | OXYGEN SATURATION: 96 % | BODY MASS INDEX: 25.62 KG/M2 | DIASTOLIC BLOOD PRESSURE: 72 MMHG | HEIGHT: 71 IN | WEIGHT: 183 LBS | RESPIRATION RATE: 18 BRPM | HEART RATE: 59 BPM | TEMPERATURE: 98 F

## 2024-04-23 DIAGNOSIS — N28.9 DISORDER OF KIDNEY AND URETER, UNSPECIFIED: ICD-10-CM

## 2024-04-23 DIAGNOSIS — N28.1 CYST OF KIDNEY, ACQUIRED: ICD-10-CM

## 2024-04-23 PROCEDURE — 99214 OFFICE O/P EST MOD 30 MIN: CPT

## 2024-04-23 RX ORDER — LOSARTAN POTASSIUM 25 MG/1
25 TABLET, FILM COATED ORAL
Refills: 0 | Status: ACTIVE | COMMUNITY

## 2024-04-23 RX ORDER — TADALAFIL 10 MG/1
10 TABLET, FILM COATED ORAL
Refills: 0 | Status: ACTIVE | COMMUNITY

## 2024-04-23 NOTE — HISTORY OF PRESENT ILLNESS
[FreeTextEntry1] : Joao Nieto is a 68-year-old gentleman who I saw in consult on 2020 for renal cysts.  His past medical history is significant for dyslipidemia, BPH, prediabetes mellitus, bipolar disorder with no history of lithium use, and renal cysts.  The renal cysts were first noted around  prior during workup for back pain.  A renal cyst was drained in the past due to pain.  His mother does not have renal cysts.  His father  at age 76 with colon cancer.  He has a brother who he does not know much about and a sister who  of AIDS at age 35.    I am seeing Mr. Nieto today in person today.  He continues to work at [employer]  He has remained healthy.

## 2024-04-23 NOTE — PHYSICAL EXAM
[General Appearance - Alert] : alert [General Appearance - In No Acute Distress] : in no acute distress [Sclera] : the sclera and conjunctiva were normal [Extraocular Movements] : extraocular movements were intact [Neck Appearance] : the appearance of the neck was normal [] : no respiratory distress [Respiration, Rhythm And Depth] : normal respiratory rhythm and effort [Exaggerated Use Of Accessory Muscles For Inspiration] : no accessory muscle use [Auscultation Breath Sounds / Voice Sounds] : lungs were clear to auscultation bilaterally [Heart Rate And Rhythm] : heart rate was normal and rhythm regular [Heart Sounds] : normal S1 and S2 [Heart Sounds Gallop] : no gallops [Murmurs] : no murmurs [Heart Sounds Pericardial Friction Rub] : no pericardial rub [Edema] : there was no peripheral edema [Bowel Sounds] : normal bowel sounds [Abdomen Soft] : soft [Abdomen Tenderness] : non-tender [Abnormal Walk] : normal gait [Involuntary Movements] : no involuntary movements were seen [Skin Color & Pigmentation] : normal skin color and pigmentation [Skin Turgor] : normal skin turgor [No Focal Deficits] : no focal deficits [Oriented To Time, Place, And Person] : oriented to person, place, and time [Impaired Insight] : insight and judgment were intact [Affect] : the affect was normal [Mood] : the mood was normal [Over the Past 2 Weeks, Have You Felt Down, Depressed, or Hopeless?] : 1.) Over the past 2 weeks, have you felt down, depressed, or hopeless? No [Over the Past 2 Weeks, Have You Felt Little Interest or Pleasure Doing Things?] : 2.) Over the past 2 weeks, have you felt little interest or pleasure doing things? No

## 2024-04-23 NOTE — CONSULT LETTER
[Consult Letter:] : I had the pleasure of evaluating your patient, [unfilled]. [Please see my note below.] : Please see my note below. [Consult Closing:] : Thank you very much for allowing me to participate in the care of this patient.  If you have any questions, please do not hesitate to contact me. [Sincerely,] : Sincerely, [Dear  ___] : Dear  [unfilled], [FreeTextEntry3] : Grey

## 2024-04-23 NOTE — ASSESSMENT
[FreeTextEntry1] : Joao Nieto is a 68-year-old gentleman with dyslipidemia, BPH, prediabetes mellitus, bipolar disorder with no history of lithium use, and renal cysts. The renal cysts were first noted around 6-7 years ago during workup for back pain. Mr. Nieto has no visible hematuria. There is no family history of renal cystic disease.  RENAL ULTRASOUND (07/01/2020) 13.6 cm right kidney and 12.6 cm left kidney with diffuse bilateral renal cortical parenchyma thinning and increased cortical parenchymal echogenicity. There are numerous bilateral renal cysts including large right renal cysts measuring 5.5 x 4.8 x 5.4 cm , 6.3 x 5.6 x 5.7 cm, and 4.7 x 3.9 x 4.7 cm and large left renal cysts measuring 7.7 x 6.5 x 7 cm, 7.7 x 5.9 x 6.8 cm, and 4.6 x 3.7 x 3.8 cm.  RENAL ULTRASOUND (05/29/2021) R. 16.7 cm, L. 13.4 cm, bilateral parenchymal thinning and scattered cortical cysts measuring up to 8 cm in the left interpolar region and 6 cm in the right renal upper pole. There are no suspicious solid masses.  RENAL ULTRASOUND (03/14/2022) R. 17 cm with multiple renal cysts measuring up to 6.1 cm with increased renal echogenicity. L. 13.4 cm with multiple renal cysts measuring up to 8.9 cm, exophytic cyst off lower pose, increased renal echogenicity.  RENAL ULTRASOUND (03/09/2023) R. 18.1 cm. Multiple anechoic structures consistent with cysts. L. 18.9 cm. Multiple anechoic structures consistent with cysts. Unchanged mildly increased renal cortical echogenicity. Kidneys are not significantly changed in size when compared to the prior study.  RENAL ULTRASOUND ((03/01/2024) R. kidney: 20.6 cm. No hydronephrosis. Partially visualized cysts of varying size. L. kidney: 16.0 cm. No hydronephrosis. Partially visualized cysts of varying size  The BUN/creatinine levels have trended as follow: 23/1.2 (06/29/2020), 19/1.24 (06/01/2021), 18/1.28 (05/05/2021), 23/1.27 (03/04/2022), 23/1.33 (09/08/2022), 21/1.29 (03/02/2023), 20/1.31 (09/06/2023) --> started losartan --> 18/1.24 (03/02/2024). The urine albumin to creatinine ratio has trended as follows: 31 mcg/mg, 37 mcg/mg, 58 mcg/mg, 41 mcg/mg, 77 mcg/mg and most recently 130 mg/g.  The urinalysis is negative from 09/06/2023).  IMPRESSION:  (1) Renal cystic disease. There is clearly significant bilateral renal cystic disease. I am currently unclear as to whether this represents autosomal dominant polycystic kidney disease or acquired renal cystic disease. I am suspicious for ADPKD. The renal function remains fairly close to normal. He asked his mother about her medical history. She told him she has cysts on her kidneys.  (2) Blood pressures are well controlled. Mr. Nieto did not tolerate losartan 25 mg once daily in the past.  He is tolerating it now.    (3) Hyperlipidemia. Prior Cholesterol 178 mg/dL, HDL 45 mg/dL,  mg/dL,  mg/dL. Mr. Nieto is taking atorvastatin. He has cut back on his red meat..  RECOMMENDATIONS:  (1) Continue losartan.  (2) We talked about ways to protect the kidneys:  -Good blood pressure control -Avoid the use of NSAIDS (ibuprofen, Motrin, Aleve, Celebrex, etc.). Okay to use Tylenol. -Avoid salt 'like the plague'. Limit sodium intake. Do not add salt to your food. -Limit the intake of animal products. Avoid high animal protein diets. Mr. Nieto is doing this. He has increased  his vegetable intake. -Stay well hydrated. -Good cholesterol control.  (3) Regular exercise   (4) Repeat CMP, urinalysis, random urine for microalbumin and creatinine prior to follow up in 6 months.   Return in 6 months with the tests below:

## 2024-05-16 ENCOUNTER — NON-APPOINTMENT (OUTPATIENT)
Age: 69
End: 2024-05-16

## 2024-08-12 ENCOUNTER — NON-APPOINTMENT (OUTPATIENT)
Age: 69
End: 2024-08-12

## 2024-08-13 ENCOUNTER — APPOINTMENT (OUTPATIENT)
Dept: GASTROENTEROLOGY | Facility: CLINIC | Age: 69
End: 2024-08-13
Payer: MEDICARE

## 2024-08-13 VITALS
HEART RATE: 59 BPM | DIASTOLIC BLOOD PRESSURE: 70 MMHG | SYSTOLIC BLOOD PRESSURE: 120 MMHG | BODY MASS INDEX: 25.2 KG/M2 | OXYGEN SATURATION: 97 % | HEIGHT: 71 IN | WEIGHT: 180 LBS

## 2024-08-13 DIAGNOSIS — Z12.11 ENCOUNTER FOR SCREENING FOR MALIGNANT NEOPLASM OF COLON: ICD-10-CM

## 2024-08-13 DIAGNOSIS — Z80.0 FAMILY HISTORY OF MALIGNANT NEOPLASM OF DIGESTIVE ORGANS: ICD-10-CM

## 2024-08-13 DIAGNOSIS — K59.09 OTHER CONSTIPATION: ICD-10-CM

## 2024-08-13 PROCEDURE — 99204 OFFICE O/P NEW MOD 45 MIN: CPT

## 2024-08-13 RX ORDER — SODIUM SULFATE, POTASSIUM SULFATE AND MAGNESIUM SULFATE 1.6; 3.13; 17.5 G/177ML; G/177ML; G/177ML
17.5-3.13-1.6 SOLUTION ORAL
Qty: 1 | Refills: 0 | Status: ACTIVE | COMMUNITY
Start: 2024-08-13 | End: 1900-01-01

## 2024-08-13 NOTE — ASSESSMENT
[FreeTextEntry1] : Will plan on a colonoscopy for screening.  Explained risks/benefits/alternatives including not limited to bleeding, infection, perforation, missed lesions, anesthesia complications.  Patient understands and agrees, all questions answered.  Will use a split dose Suprep prep with clears the day prior. Will need a colonoscopy every 5 years due to FHx of colon cancer. Can use miralax PRN for constipation.  Thank you for referring Mr. ROJAS.  Please do not hesitate to call to further discuss his/her care.  Note faxed to requesting MD.

## 2024-08-13 NOTE — HISTORY OF PRESENT ILLNESS
[FreeTextEntry1] : Mr. Nieto is a pleasant 69M h/o BPH, HLD, polycystic kidney disease who is referred by Dr. Merida for colon cancer screening. Mild constipation, improves if he eats plenty of vegetables. Has not tried any medications for his constipation. No blood, black stool. Very rare mild heartburn, not bothersome. No dysphagia, odynophagia. Last colonoscopy 8/19 with Dr. Wes Gray, had 1 transverse colon tubular adenoma removed, 1 hyperplastic polyp removed. Father had colon cancer. Does not smoke or drink.

## 2024-10-09 ENCOUNTER — APPOINTMENT (OUTPATIENT)
Dept: GASTROENTEROLOGY | Facility: HOSPITAL | Age: 69
End: 2024-10-09

## 2024-10-16 ENCOUNTER — NON-APPOINTMENT (OUTPATIENT)
Age: 69
End: 2024-10-16

## 2024-10-21 ENCOUNTER — APPOINTMENT (OUTPATIENT)
Dept: NEPHROLOGY | Facility: CLINIC | Age: 69
End: 2024-10-21
Payer: MEDICARE

## 2024-10-21 VITALS
BODY MASS INDEX: 25.48 KG/M2 | HEIGHT: 71 IN | WEIGHT: 182 LBS | HEART RATE: 63 BPM | RESPIRATION RATE: 18 BRPM | DIASTOLIC BLOOD PRESSURE: 72 MMHG | OXYGEN SATURATION: 96 % | TEMPERATURE: 98 F | SYSTOLIC BLOOD PRESSURE: 118 MMHG

## 2024-10-21 DIAGNOSIS — R80.9 PROTEINURIA, UNSPECIFIED: ICD-10-CM

## 2024-10-21 DIAGNOSIS — N28.9 DISORDER OF KIDNEY AND URETER, UNSPECIFIED: ICD-10-CM

## 2024-10-21 DIAGNOSIS — N28.1 CYST OF KIDNEY, ACQUIRED: ICD-10-CM

## 2024-10-21 PROCEDURE — 99214 OFFICE O/P EST MOD 30 MIN: CPT

## 2024-12-12 ENCOUNTER — RESULT REVIEW (OUTPATIENT)
Age: 69
End: 2024-12-12

## 2025-01-03 ENCOUNTER — NON-APPOINTMENT (OUTPATIENT)
Age: 70
End: 2025-01-03

## 2025-04-04 ENCOUNTER — NON-APPOINTMENT (OUTPATIENT)
Age: 70
End: 2025-04-04

## 2025-04-11 LAB
ANION GAP SERPL CALC-SCNC: 13 MMOL/L
BUN SERPL-MCNC: 28 MG/DL
CALCIUM SERPL-MCNC: 9.7 MG/DL
CHLORIDE SERPL-SCNC: 102 MMOL/L
CO2 SERPL-SCNC: 23 MMOL/L
CREAT SERPL-MCNC: 1.8 MG/DL
EGFRCR SERPLBLD CKD-EPI 2021: 41 ML/MIN/1.73M2
GLUCOSE SERPL-MCNC: 105 MG/DL
POTASSIUM SERPL-SCNC: 5 MMOL/L
SODIUM SERPL-SCNC: 138 MMOL/L

## 2025-04-15 ENCOUNTER — APPOINTMENT (OUTPATIENT)
Dept: NEPHROLOGY | Facility: CLINIC | Age: 70
End: 2025-04-15
Payer: MEDICARE

## 2025-04-15 VITALS
TEMPERATURE: 98.2 F | DIASTOLIC BLOOD PRESSURE: 84 MMHG | HEIGHT: 71 IN | RESPIRATION RATE: 16 BRPM | SYSTOLIC BLOOD PRESSURE: 132 MMHG | HEART RATE: 69 BPM | BODY MASS INDEX: 26.18 KG/M2 | OXYGEN SATURATION: 97 % | WEIGHT: 187 LBS

## 2025-04-15 DIAGNOSIS — N17.9 ACUTE KIDNEY FAILURE, UNSPECIFIED: ICD-10-CM

## 2025-04-15 DIAGNOSIS — Q61.2 POLYCYSTIC KIDNEY, ADULT TYPE: ICD-10-CM

## 2025-04-15 DIAGNOSIS — N28.9 DISORDER OF KIDNEY AND URETER, UNSPECIFIED: ICD-10-CM

## 2025-04-15 DIAGNOSIS — N28.1 CYST OF KIDNEY, ACQUIRED: ICD-10-CM

## 2025-04-15 PROCEDURE — 99214 OFFICE O/P EST MOD 30 MIN: CPT

## 2025-04-26 LAB
ALBUMIN SERPL ELPH-MCNC: 4.7 G/DL
ALP BLD-CCNC: 93 U/L
ALT SERPL-CCNC: 16 U/L
ANION GAP SERPL CALC-SCNC: 16 MMOL/L
APPEARANCE: CLEAR
AST SERPL-CCNC: 19 U/L
BACTERIA: NEGATIVE /HPF
BILIRUB SERPL-MCNC: 0.6 MG/DL
BILIRUBIN URINE: NEGATIVE
BLOOD URINE: NEGATIVE
BUN SERPL-MCNC: 25 MG/DL
CALCIUM SERPL-MCNC: 9.9 MG/DL
CAST: 0 /LPF
CHLORIDE SERPL-SCNC: 100 MMOL/L
CO2 SERPL-SCNC: 22 MMOL/L
COLOR: YELLOW
CREAT SERPL-MCNC: 2.08 MG/DL
CREAT SPEC-SCNC: 53 MG/DL
EGFRCR SERPLBLD CKD-EPI 2021: 34 ML/MIN/1.73M2
EPITHELIAL CELLS: 0 /HPF
GLUCOSE QUALITATIVE U: NEGATIVE MG/DL
GLUCOSE SERPL-MCNC: 106 MG/DL
KETONES URINE: NEGATIVE MG/DL
LEUKOCYTE ESTERASE URINE: ABNORMAL
MICROALBUMIN 24H UR DL<=1MG/L-MCNC: 5.6 MG/DL
MICROALBUMIN/CREAT 24H UR-RTO: 105 MG/G
MICROSCOPIC-UA: NORMAL
NITRITE URINE: NEGATIVE
PH URINE: 6.5
PHOSPHATE SERPL-MCNC: 3.1 MG/DL
POTASSIUM SERPL-SCNC: 5 MMOL/L
PROT SERPL-MCNC: 7.5 G/DL
PROTEIN URINE: NORMAL MG/DL
RED BLOOD CELLS URINE: 0 /HPF
SODIUM SERPL-SCNC: 138 MMOL/L
SPECIFIC GRAVITY URINE: 1.01
UROBILINOGEN URINE: 0.2 MG/DL
WHITE BLOOD CELLS URINE: 2 /HPF

## 2025-06-02 ENCOUNTER — NON-APPOINTMENT (OUTPATIENT)
Age: 70
End: 2025-06-02

## 2025-08-15 ENCOUNTER — LABORATORY RESULT (OUTPATIENT)
Age: 70
End: 2025-08-15

## 2025-09-02 ENCOUNTER — APPOINTMENT (OUTPATIENT)
Dept: NEPHROLOGY | Facility: CLINIC | Age: 70
End: 2025-09-02
Payer: MEDICARE

## 2025-09-02 VITALS
SYSTOLIC BLOOD PRESSURE: 144 MMHG | HEIGHT: 71 IN | DIASTOLIC BLOOD PRESSURE: 82 MMHG | HEART RATE: 71 BPM | RESPIRATION RATE: 16 BRPM | WEIGHT: 184 LBS | OXYGEN SATURATION: 96 % | BODY MASS INDEX: 25.76 KG/M2

## 2025-09-02 VITALS — DIASTOLIC BLOOD PRESSURE: 78 MMHG | SYSTOLIC BLOOD PRESSURE: 132 MMHG

## 2025-09-02 DIAGNOSIS — Z13.31 ENCOUNTER FOR SCREENING FOR DEPRESSION: ICD-10-CM

## 2025-09-02 DIAGNOSIS — Q61.2 POLYCYSTIC KIDNEY, ADULT TYPE: ICD-10-CM

## 2025-09-02 DIAGNOSIS — N18.30 CHRONIC KIDNEY DISEASE, STAGE 3 UNSPECIFIED: ICD-10-CM

## 2025-09-02 DIAGNOSIS — N17.9 ACUTE KIDNEY FAILURE, UNSPECIFIED: ICD-10-CM

## 2025-09-02 PROCEDURE — 99214 OFFICE O/P EST MOD 30 MIN: CPT

## 2025-09-02 RX ORDER — LOSARTAN POTASSIUM 25 MG/1
25 TABLET, FILM COATED ORAL
Qty: 45 | Refills: 0 | Status: ACTIVE | COMMUNITY
Start: 2025-09-02 | End: 1900-01-01